# Patient Record
Sex: FEMALE | Race: WHITE | NOT HISPANIC OR LATINO | URBAN - METROPOLITAN AREA
[De-identification: names, ages, dates, MRNs, and addresses within clinical notes are randomized per-mention and may not be internally consistent; named-entity substitution may affect disease eponyms.]

---

## 2024-05-16 ENCOUNTER — OFFICE (OUTPATIENT)
Dept: URBAN - METROPOLITAN AREA CLINIC 10 | Facility: CLINIC | Age: 24
End: 2024-05-16

## 2024-05-16 VITALS
DIASTOLIC BLOOD PRESSURE: 93 MMHG | HEART RATE: 90 BPM | HEIGHT: 62 IN | SYSTOLIC BLOOD PRESSURE: 133 MMHG | WEIGHT: 118 LBS

## 2024-05-16 DIAGNOSIS — K64.8 OTHER HEMORRHOIDS: ICD-10-CM

## 2024-05-16 DIAGNOSIS — K62.5 HEMORRHAGE OF ANUS AND RECTUM: ICD-10-CM

## 2024-05-16 DIAGNOSIS — K58.2 MIXED IRRITABLE BOWEL SYNDROME: ICD-10-CM

## 2024-05-16 DIAGNOSIS — R10.84 GENERALIZED ABDOMINAL PAIN: ICD-10-CM

## 2024-05-16 PROCEDURE — 99204 OFFICE O/P NEW MOD 45 MIN: CPT | Performed by: NURSE PRACTITIONER

## 2024-05-16 RX ORDER — HYOSCYAMINE SULFATE 0.12 MG/1
TABLET ORAL; SUBLINGUAL
Qty: 60 | Refills: 6 | Status: ACTIVE
Start: 2024-05-16

## 2024-05-16 NOTE — SERVICENOTES
Explained risks of colonoscopy and recommended she have one due to rectal bleeding. She is declining at this time and would prefer to discuss at follow up.

## 2024-05-16 NOTE — SERVICEHPINOTES
Ms. Gonzalez is 23 years old. She is here for initial visit with chief complaint of abdominal pain. She describes 3 syncopal episodes over the past 6 months where she would tried to pass a hard stool, strain and pass out. States she did not seek evaluation at that time. She describes a lifelong history of constipation and diarrhea that tends to favor constipation. She describes abdominal pain as mid abdominal, crampy, lasting until she has bowel movement and relieved thereafter. States she may have a week where she has bowel movements every day and then will skip a week or 2. States she is tried laxatives in the past but is miserable as she will have diarrhea for days. She reports intermittent blood on toilet tissue when wiping and states last time she noticed this was a few months ago. Denies rectal pain. No reflux. No dysphagia. No nausea or vomiting. No hematemesis. No family history of CRC.

## 2024-08-14 ENCOUNTER — OFFICE (OUTPATIENT)
Dept: URBAN - METROPOLITAN AREA CLINIC 10 | Facility: CLINIC | Age: 24
End: 2024-08-14
Payer: COMMERCIAL

## 2024-08-14 VITALS
WEIGHT: 122 LBS | SYSTOLIC BLOOD PRESSURE: 121 MMHG | HEIGHT: 62 IN | HEART RATE: 77 BPM | OXYGEN SATURATION: 98 % | DIASTOLIC BLOOD PRESSURE: 78 MMHG

## 2024-08-14 DIAGNOSIS — K58.2 MIXED IRRITABLE BOWEL SYNDROME: ICD-10-CM

## 2024-08-14 DIAGNOSIS — R14.0 ABDOMINAL DISTENSION (GASEOUS): ICD-10-CM

## 2024-08-14 PROCEDURE — 99214 OFFICE O/P EST MOD 30 MIN: CPT | Performed by: NURSE PRACTITIONER

## 2024-08-14 RX ORDER — OMEPRAZOLE 40 MG/1
CAPSULE, DELAYED RELEASE ORAL
Qty: 30 | Refills: 1 | Status: ACTIVE
Start: 2024-08-14

## 2024-08-14 NOTE — SERVICEHPINOTES
Ms. Horton is 23 years old.  She is here today for follow up abdominal pain. States her symptoms of abdominal pain have improved considerably. states she did not take hyoscyamine as she did not need it after last office visit. Today, only GI complaint is bloating. States she just feels bloated all the time and wakes up that way every morning. States she has not identified any food triggers. Denies reflux symptoms, dysphagia, odynophagia, nausea, vomiting. States she took medication for reflux when she was in [place] high. She has never had EGD.br
br05/2024 CBC and CMP unremarkable
br  -abdominal ultrasound with no abnormalities noted

## 2024-08-14 NOTE — SERVICENOTES
rule out H pylori.  Give trial of omeprazole with plan for EGD if no better after 8 weeks.  Normal labs and ultrasound in May.